# Patient Record
Sex: FEMALE | Race: WHITE | Employment: OTHER | URBAN - METROPOLITAN AREA
[De-identification: names, ages, dates, MRNs, and addresses within clinical notes are randomized per-mention and may not be internally consistent; named-entity substitution may affect disease eponyms.]

---

## 2023-06-26 ENCOUNTER — OFFICE VISIT (OUTPATIENT)
Age: 83
End: 2023-06-26
Payer: MEDICARE

## 2023-06-26 VITALS
DIASTOLIC BLOOD PRESSURE: 72 MMHG | RESPIRATION RATE: 16 BRPM | HEART RATE: 95 BPM | TEMPERATURE: 98.4 F | SYSTOLIC BLOOD PRESSURE: 117 MMHG | OXYGEN SATURATION: 98 %

## 2023-06-26 DIAGNOSIS — C43.72 MELANOMA OF LOWER LEG, LEFT (HCC): Primary | ICD-10-CM

## 2023-06-26 PROCEDURE — 1123F ACP DISCUSS/DSCN MKR DOCD: CPT | Performed by: SURGERY

## 2023-06-26 PROCEDURE — 99204 OFFICE O/P NEW MOD 45 MIN: CPT | Performed by: SURGERY

## 2023-06-28 ASSESSMENT — ENCOUNTER SYMPTOMS
SHORTNESS OF BREATH: 0
CHEST TIGHTNESS: 0

## 2023-07-10 ENCOUNTER — ANESTHESIA EVENT (OUTPATIENT)
Facility: HOSPITAL | Age: 83
End: 2023-07-10
Payer: MEDICARE

## 2023-07-11 ENCOUNTER — ANESTHESIA (OUTPATIENT)
Facility: HOSPITAL | Age: 83
End: 2023-07-11
Payer: MEDICARE

## 2023-07-11 ENCOUNTER — HOSPITAL ENCOUNTER (OUTPATIENT)
Facility: HOSPITAL | Age: 83
Setting detail: OUTPATIENT SURGERY
Discharge: NURSING, REHAB OR LONG-TERM CARE | End: 2023-07-11
Attending: SURGERY | Admitting: SURGERY
Payer: MEDICARE

## 2023-07-11 ENCOUNTER — HOSPITAL ENCOUNTER (OUTPATIENT)
Facility: HOSPITAL | Age: 83
Discharge: HOME | End: 2023-07-14
Attending: SURGERY
Payer: MEDICARE

## 2023-07-11 VITALS
HEIGHT: 60 IN | SYSTOLIC BLOOD PRESSURE: 130 MMHG | DIASTOLIC BLOOD PRESSURE: 71 MMHG | TEMPERATURE: 97.4 F | RESPIRATION RATE: 24 BRPM | OXYGEN SATURATION: 95 % | HEART RATE: 95 BPM | WEIGHT: 101 LBS | BODY MASS INDEX: 19.83 KG/M2

## 2023-07-11 DIAGNOSIS — C43.72 MELANOMA OF LOWER LEG, LEFT (HCC): ICD-10-CM

## 2023-07-11 DIAGNOSIS — C43.9 MALIGNANT MELANOMA, UNSPECIFIED SITE (HCC): Primary | ICD-10-CM

## 2023-07-11 PROCEDURE — 7100000010 HC PHASE II RECOVERY - FIRST 15 MIN: Performed by: SURGERY

## 2023-07-11 PROCEDURE — 88307 TISSUE EXAM BY PATHOLOGIST: CPT

## 2023-07-11 PROCEDURE — 15271 SKIN SUB GRAFT TRNK/ARM/LEG: CPT | Performed by: SURGERY

## 2023-07-11 PROCEDURE — 2709999900 HC NON-CHARGEABLE SUPPLY: Performed by: SURGERY

## 2023-07-11 PROCEDURE — 7100000000 HC PACU RECOVERY - FIRST 15 MIN: Performed by: SURGERY

## 2023-07-11 PROCEDURE — 6360000002 HC RX W HCPCS

## 2023-07-11 PROCEDURE — 2500000003 HC RX 250 WO HCPCS: Performed by: NURSE ANESTHETIST, CERTIFIED REGISTERED

## 2023-07-11 PROCEDURE — 2500000003 HC RX 250 WO HCPCS: Performed by: SURGERY

## 2023-07-11 PROCEDURE — 6360000002 HC RX W HCPCS: Performed by: NURSE ANESTHETIST, CERTIFIED REGISTERED

## 2023-07-11 PROCEDURE — 2580000003 HC RX 258: Performed by: SURGERY

## 2023-07-11 PROCEDURE — 11606 EXC TR-EXT MAL+MARG >4 CM: CPT | Performed by: SURGERY

## 2023-07-11 PROCEDURE — 88305 TISSUE EXAM BY PATHOLOGIST: CPT

## 2023-07-11 PROCEDURE — 7100000011 HC PHASE II RECOVERY - ADDTL 15 MIN: Performed by: SURGERY

## 2023-07-11 PROCEDURE — 6370000000 HC RX 637 (ALT 250 FOR IP): Performed by: NURSE ANESTHETIST, CERTIFIED REGISTERED

## 2023-07-11 PROCEDURE — 3600000002 HC SURGERY LEVEL 2 BASE: Performed by: SURGERY

## 2023-07-11 PROCEDURE — 2580000003 HC RX 258: Performed by: NURSE ANESTHETIST, CERTIFIED REGISTERED

## 2023-07-11 PROCEDURE — 38765 REMOVE GROIN LYMPH NODES: CPT | Performed by: SURGERY

## 2023-07-11 PROCEDURE — 6360000002 HC RX W HCPCS: Performed by: SURGERY

## 2023-07-11 PROCEDURE — 7100000001 HC PACU RECOVERY - ADDTL 15 MIN: Performed by: SURGERY

## 2023-07-11 PROCEDURE — 3700000000 HC ANESTHESIA ATTENDED CARE: Performed by: SURGERY

## 2023-07-11 PROCEDURE — 38900 IO MAP OF SENT LYMPH NODE: CPT | Performed by: SURGERY

## 2023-07-11 PROCEDURE — 3600000012 HC SURGERY LEVEL 2 ADDTL 15MIN: Performed by: SURGERY

## 2023-07-11 PROCEDURE — 3700000001 HC ADD 15 MINUTES (ANESTHESIA): Performed by: SURGERY

## 2023-07-11 DEVICE — GRAFT FISH-SKIN 7X10CM SURGICLOSE: Type: IMPLANTABLE DEVICE | Site: LEG | Status: FUNCTIONAL

## 2023-07-11 RX ORDER — PROPOFOL 10 MG/ML
INJECTION, EMULSION INTRAVENOUS PRN
Status: DISCONTINUED | OUTPATIENT
Start: 2023-07-11 | End: 2023-07-11 | Stop reason: SDUPTHER

## 2023-07-11 RX ORDER — PROCHLORPERAZINE EDISYLATE 5 MG/ML
10 INJECTION INTRAMUSCULAR; INTRAVENOUS
Status: DISCONTINUED | OUTPATIENT
Start: 2023-07-11 | End: 2023-07-11 | Stop reason: HOSPADM

## 2023-07-11 RX ORDER — SODIUM CHLORIDE 0.9 % (FLUSH) 0.9 %
5-40 SYRINGE (ML) INJECTION PRN
Status: DISCONTINUED | OUTPATIENT
Start: 2023-07-11 | End: 2023-07-11 | Stop reason: HOSPADM

## 2023-07-11 RX ORDER — FENTANYL CITRATE 50 UG/ML
INJECTION, SOLUTION INTRAMUSCULAR; INTRAVENOUS PRN
Status: DISCONTINUED | OUTPATIENT
Start: 2023-07-11 | End: 2023-07-11 | Stop reason: SDUPTHER

## 2023-07-11 RX ORDER — LIDOCAINE HYDROCHLORIDE 20 MG/ML
INJECTION, SOLUTION EPIDURAL; INFILTRATION; INTRACAUDAL; PERINEURAL PRN
Status: DISCONTINUED | OUTPATIENT
Start: 2023-07-11 | End: 2023-07-11 | Stop reason: SDUPTHER

## 2023-07-11 RX ORDER — FENTANYL CITRATE 50 UG/ML
25 INJECTION, SOLUTION INTRAMUSCULAR; INTRAVENOUS EVERY 5 MIN PRN
Status: DISCONTINUED | OUTPATIENT
Start: 2023-07-11 | End: 2023-07-11 | Stop reason: HOSPADM

## 2023-07-11 RX ORDER — ALBUTEROL SULFATE 2.5 MG/3ML
SOLUTION RESPIRATORY (INHALATION)
Status: COMPLETED
Start: 2023-07-11 | End: 2023-07-11

## 2023-07-11 RX ORDER — SODIUM CHLORIDE, SODIUM LACTATE, POTASSIUM CHLORIDE, CALCIUM CHLORIDE 600; 310; 30; 20 MG/100ML; MG/100ML; MG/100ML; MG/100ML
INJECTION, SOLUTION INTRAVENOUS CONTINUOUS
Status: DISCONTINUED | OUTPATIENT
Start: 2023-07-11 | End: 2023-07-11 | Stop reason: HOSPADM

## 2023-07-11 RX ORDER — SODIUM CHLORIDE 0.9 % (FLUSH) 0.9 %
5-40 SYRINGE (ML) INJECTION EVERY 12 HOURS SCHEDULED
Status: DISCONTINUED | OUTPATIENT
Start: 2023-07-11 | End: 2023-07-11 | Stop reason: HOSPADM

## 2023-07-11 RX ORDER — GLYCOPYRROLATE 0.2 MG/ML
INJECTION INTRAMUSCULAR; INTRAVENOUS PRN
Status: DISCONTINUED | OUTPATIENT
Start: 2023-07-11 | End: 2023-07-11 | Stop reason: SDUPTHER

## 2023-07-11 RX ORDER — SODIUM CHLORIDE 9 MG/ML
INJECTION, SOLUTION INTRAVENOUS PRN
Status: DISCONTINUED | OUTPATIENT
Start: 2023-07-11 | End: 2023-07-11 | Stop reason: HOSPADM

## 2023-07-11 RX ORDER — FAMOTIDINE 20 MG/1
20 TABLET, FILM COATED ORAL ONCE
Status: COMPLETED | OUTPATIENT
Start: 2023-07-11 | End: 2023-07-11

## 2023-07-11 RX ORDER — HYDROCODONE BITARTRATE AND ACETAMINOPHEN 5; 325 MG/1; MG/1
1 TABLET ORAL EVERY 4 HOURS PRN
Qty: 18 TABLET | Refills: 0 | Status: SHIPPED | OUTPATIENT
Start: 2023-07-11 | End: 2023-07-14

## 2023-07-11 RX ORDER — EPHEDRINE SULFATE/0.9% NACL/PF 50 MG/5 ML
SYRINGE (ML) INTRAVENOUS PRN
Status: DISCONTINUED | OUTPATIENT
Start: 2023-07-11 | End: 2023-07-11 | Stop reason: SDUPTHER

## 2023-07-11 RX ORDER — DEXAMETHASONE SODIUM PHOSPHATE 4 MG/ML
INJECTION, SOLUTION INTRA-ARTICULAR; INTRALESIONAL; INTRAMUSCULAR; INTRAVENOUS; SOFT TISSUE PRN
Status: DISCONTINUED | OUTPATIENT
Start: 2023-07-11 | End: 2023-07-11 | Stop reason: SDUPTHER

## 2023-07-11 RX ORDER — BUPIVACAINE HYDROCHLORIDE AND EPINEPHRINE 5; 5 MG/ML; UG/ML
INJECTION, SOLUTION EPIDURAL; INTRACAUDAL; PERINEURAL PRN
Status: DISCONTINUED | OUTPATIENT
Start: 2023-07-11 | End: 2023-07-11 | Stop reason: ALTCHOICE

## 2023-07-11 RX ORDER — ALBUTEROL SULFATE 2.5 MG/3ML
2.5 SOLUTION RESPIRATORY (INHALATION) ONCE
Status: COMPLETED | OUTPATIENT
Start: 2023-07-11 | End: 2023-07-11

## 2023-07-11 RX ORDER — ONDANSETRON 2 MG/ML
4 INJECTION INTRAMUSCULAR; INTRAVENOUS
Status: DISCONTINUED | OUTPATIENT
Start: 2023-07-11 | End: 2023-07-11 | Stop reason: HOSPADM

## 2023-07-11 RX ORDER — ONDANSETRON 2 MG/ML
INJECTION INTRAMUSCULAR; INTRAVENOUS PRN
Status: DISCONTINUED | OUTPATIENT
Start: 2023-07-11 | End: 2023-07-11 | Stop reason: SDUPTHER

## 2023-07-11 RX ORDER — DEXAMETHASONE SODIUM PHOSPHATE 4 MG/ML
INJECTION, SOLUTION INTRA-ARTICULAR; INTRALESIONAL; INTRAMUSCULAR; INTRAVENOUS; SOFT TISSUE PRN
Status: DISCONTINUED | OUTPATIENT
Start: 2023-07-11 | End: 2023-07-11

## 2023-07-11 RX ADMIN — PROPOFOL 100 MG: 10 INJECTION, EMULSION INTRAVENOUS at 16:14

## 2023-07-11 RX ADMIN — DEXMEDETOMIDINE HYDROCHLORIDE 10 MCG: 100 INJECTION, SOLUTION INTRAVENOUS at 16:07

## 2023-07-11 RX ADMIN — ALBUTEROL SULFATE 2.5 MG: 2.5 SOLUTION RESPIRATORY (INHALATION) at 18:03

## 2023-07-11 RX ADMIN — FENTANYL CITRATE 50 MCG: 50 INJECTION INTRAMUSCULAR; INTRAVENOUS at 16:14

## 2023-07-11 RX ADMIN — Medication 10 MG: at 16:44

## 2023-07-11 RX ADMIN — GLYCOPYRROLATE 0.2 MG: 0.2 INJECTION, SOLUTION INTRAMUSCULAR; INTRAVENOUS at 16:07

## 2023-07-11 RX ADMIN — FAMOTIDINE 20 MG: 20 TABLET ORAL at 14:30

## 2023-07-11 RX ADMIN — ONDANSETRON 4 MG: 2 INJECTION INTRAMUSCULAR; INTRAVENOUS at 16:23

## 2023-07-11 RX ADMIN — DEXAMETHASONE SODIUM PHOSPHATE 8 MG: 4 INJECTION, SOLUTION INTRAMUSCULAR; INTRAVENOUS at 16:25

## 2023-07-11 RX ADMIN — Medication 1.1 MILLICURIE: at 10:25

## 2023-07-11 RX ADMIN — FENTANYL CITRATE 50 MCG: 50 INJECTION INTRAMUSCULAR; INTRAVENOUS at 16:22

## 2023-07-11 RX ADMIN — PROPOFOL 50 MG: 10 INJECTION, EMULSION INTRAVENOUS at 16:52

## 2023-07-11 RX ADMIN — Medication 10 MG: at 16:31

## 2023-07-11 RX ADMIN — LIDOCAINE HYDROCHLORIDE 100 MG: 20 INJECTION, SOLUTION EPIDURAL; INFILTRATION; INTRACAUDAL; PERINEURAL at 16:14

## 2023-07-11 RX ADMIN — WATER 2000 MG: 1 INJECTION, SOLUTION INTRAMUSCULAR; INTRAVENOUS; SUBCUTANEOUS at 16:22

## 2023-07-11 RX ADMIN — SODIUM CHLORIDE, POTASSIUM CHLORIDE, SODIUM LACTATE AND CALCIUM CHLORIDE: 600; 310; 30; 20 INJECTION, SOLUTION INTRAVENOUS at 14:30

## 2023-07-11 ASSESSMENT — PAIN SCALES - GENERAL
PAINLEVEL_OUTOF10: 0

## 2023-07-11 ASSESSMENT — PAIN - FUNCTIONAL ASSESSMENT: PAIN_FUNCTIONAL_ASSESSMENT: 0-10

## 2023-07-11 ASSESSMENT — COPD QUESTIONNAIRES: CAT_SEVERITY: MILD

## 2023-07-11 NOTE — ANESTHESIA POSTPROCEDURE EVALUATION
Department of Anesthesiology  Postprocedure Note    Patient: Eli Castillo  MRN: [id number]  YOB: 1940  Date of evaluation: 7/11/2023      Procedure Summary     Date: 07/11/23 Room / Location: [facility] MAIN 05 / [facility] MAIN OR    Anesthesia Start: 2902 Anesthesia Stop: 4703    Procedure: EXCISION MELANOMA LEFT LEG; LYMPHOSCINTIGRAPHY WITH LEFT NODE BIOPSY (Left: Abdomen) Diagnosis:       Melanoma of lower leg, left (HCC)      (Melanoma of lower leg, left ([address]) [C43.72])    Surgeons: Nuno Barnard DO Responsible Provider: iSri Gann MD    Anesthesia Type: General ASA Status: 3          Anesthesia Type: General    Chavez Phase I: Chavez Score: 10    Chavez Phase II: Chavez Score: 10      Anesthesia Post Evaluation    Patient location during evaluation: PACU  Patient participation: complete - patient participated  Level of consciousness: sleepy but conscious  Pain score: 0  Airway patency: patent  Nausea & Vomiting: no nausea and no vomiting  Complications: no  Cardiovascular status: blood pressure returned to baseline  Respiratory status: acceptable  Hydration status: euvolemic

## 2023-07-11 NOTE — INTERVAL H&P NOTE
Update History & Physical    The patient's History and Physical of 7/11/2023 was reviewed with the patient and I examined the patient. There was no change. The surgical site was confirmed by the patient and me. Plan: The risks, benefits, expected outcome, and alternative to the recommended procedure have been discussed with the patient. Patient understands and wants to proceed with the procedure.      Electronically signed by Alexander Humphrey DO on 7/11/2023 at 3:38 PM

## 2023-07-11 NOTE — OP NOTE
Operative Note      Patient: Maribel Orozco  YOB: 1940  MRN: [id number]    Date of Procedure: 7/11/2023    Pre-Op Diagnosis Codes:     * Melanoma of lower leg, left ([address]) [C43.72]    Post-Op Diagnosis: Same       Procedure(s):  EXCISION MELANOMA LEFT LEG; LYMPHOSCINTIGRAPHY WITH LEFT NODE BIOPSY    Surgeon(s):  Uma Koehler DO    Assistant:   Surgical Assistant: Shiv Casiano    Anesthesia: General    Estimated Blood Loss (mL): Minimal    Complications: None    Specimens:   ID Type Source Tests Collected by Time Destination   A : LEFT LEG MELANOMA Tissue Leg SURGICAL PATHOLOGY Uma TrentcaylaDO 7/11/2023 1638    B : LEFT INGUINAL SENTINEL LYMPH NODE Tissue Groin SURGICAL PATHOLOGY Uma TrentcaylaDO 7/11/2023 1659        Implants:  Implant Name Type Inv. Item Serial No.  Lot No. LRB No. Used Action   GRAFT FISH-SKIN [id number] [employer] - [id number]  GRAFT FISH-SKIN [id number] [employer] [id number] [facility] [id number] Left 1 Implanted         Drains: * No LDAs found *    Findings: see dictation     Detailed Description of Procedure:   After informed consent was obtained the patient was taken to the operating room and placed in the supine position. General anesthesia was administered by the anesthetist and titrated to effect. The left lower extremity and left groin were prepped and draped in the usual sterile fashion and a time out procedure was performed. 2cc of methylene blue was injected at the site of melanoma. Next 1.0cm margin was marked around the border of the melanoma. In order to facilitate some closure we then made an elliptical incision around the border that measured 15.0cm x5.0cm x 1.0cm in depth. Bovie electrocautery was used to circumferentially dissect this tissue to fascia. The wound was hemostatic. The specimen was marked single short superior, single long lateral with skin anterior. Flaps were raised on the skin medially and laterally.  We were then

## 2023-07-11 NOTE — ANESTHESIA PRE PROCEDURE
Department of Anesthesiology  Preprocedure Note       Name:  Audrey Long   Age:  81 y.o.  :  1940                                          MRN:  [id number]         Date:  2023      Surgeon: Lidia Land):  Keshav Candelaria DO    Procedure: Procedure(s):  EXCISION MELANOMA LEFT LEG; LYMPHOSCINTIGRAPHY WITH LEFT NODE BIOPSY    Medications prior to admission:   Prior to Admission medications    Medication Sig Start Date End Date Taking?  Authorizing Provider   acetaminophen (TYLENOL) 325 MG tablet Take 2 tablets by mouth every 6 hours as needed 21   Ar Automatic Reconciliation   albuterol (PROVENTIL) (2.5 MG/3ML) 0.083% nebulizer solution Inhale into the lungs once    Ar Automatic Reconciliation   atorvastatin (LIPITOR) 40 MG tablet 1 tablet 19   Ar Automatic Reconciliation   ferrous sulfate (IRON 325) 325 (65 Fe) MG tablet Take 1 tablet by mouth 2 times daily 21   Ar Automatic Reconciliation   FLUoxetine (PROZAC) 40 MG capsule Take 1 capsule by mouth daily    Ar Automatic Reconciliation   fluticasone furoate-vilanterol (BREO ELLIPTA) 100-25 MCG/ACT inhaler Inhale 1 puff into the lungs daily    Ar Automatic Reconciliation   levothyroxine (SYNTHROID) 125 MCG tablet Take 100 mcg by mouth every morning (before breakfast)    Ar Automatic Reconciliation   mirabegron (MYRBETRIQ) 25 MG TB24 Take 1 tablet by mouth daily 21   Ar Automatic Reconciliation   montelukast (SINGULAIR) 10 MG tablet Take 1 tablet by mouth    Ar Automatic Reconciliation   nitrofurantoin, macrocrystal-monohydrate, (MACROBID) 100 MG capsule Take 1 capsule by mouth 2 times daily 21   Ar Automatic Reconciliation   omeprazole (PRILOSEC) 40 MG delayed release capsule Take 1 capsule by mouth daily    Ar Automatic Reconciliation   pravastatin (PRAVACHOL) 40 MG tablet Take 1 tablet by mouth    Ar Automatic Reconciliation   traZODone (DESYREL) 100 MG tablet Take 75 mg by mouth nightly    Ar Automatic Reconciliation   trospium

## 2023-07-11 NOTE — DISCHARGE INSTRUCTIONS
appointment scheduled within 14 days after surgery. If this is not yet scheduled, call the office. Any forms that you need filled out regarding your medical care can be brought to the office at follow up appointment of faxed to: [address] IF:  Temperature is over 101 degrees, a slight fever can be normal 24-48 hour after surgery. Nausea & vomiting that persists more than 24 hours after surgery. Your wound appears very red, hot, painful or swollen. Excessive bleeding occurs form the incision. *Between the hours 9-5 Monday-Friday please call the office at [phone].   If you do not receive a call back the same day, please do not hesitate to call my cell phone at [phone]    *If there is a medical emergency please go to the nearest emergency room immediately and do not hesitate to call my cell phone    [phone], after hours please call my cell phone

## 2023-07-11 NOTE — PERIOP NOTE
TRANSFER - OUT REPORT:    Verbal report given to Galindo Turcios on Lorenzo Spatz being transferred to [facility] for routine progression of patient care       Report consisted of patient's Situation, Background, Assessment and   Recommendations(SBAR). Information from the following report(s) Nurse Handoff Report, Surgery Report, and MAR was reviewed with the receiving nurse. Opportunity for questions and clarification was provided.       Patient transported with:   Patient-specific medications from Pharmacy

## 2023-07-17 ENCOUNTER — OFFICE VISIT (OUTPATIENT)
Age: 83
End: 2023-07-17

## 2023-07-17 DIAGNOSIS — C43.72 MELANOMA OF LOWER LEG, LEFT (HCC): Primary | ICD-10-CM

## 2023-07-17 PROCEDURE — 99024 POSTOP FOLLOW-UP VISIT: CPT | Performed by: SURGERY

## 2023-07-17 NOTE — PROGRESS NOTES
11/04/2021 Negative  Negative Final    Specific Gravity, Urine, POC 11/04/2021 1.025  1.001 - 1.035 NA Final    Blood (UA POC) 11/04/2021 Negative  Negative Final    pH, Urine, POC 11/04/2021 5.5  4.6 - 8.0 NA Final    Protein, Urine, POC 11/04/2021 Negative  Negative Final    Urobilinogen, POC 11/04/2021 0.2 mg/dL  0.2 - 1 Final    Nitrite, Urine, POC 11/04/2021 Positive  Negative Final    Leukocyte Esterase, Urine, POC 11/04/2021 1+  Negative Final       NM LYMPHOSCINTIGRAM  Narrative: EXAM: LYMPHOSCINTIGRAPHY FOR SENTINEL NODE IDENTIFICATION LEFT CALF    CLINICAL HISTORY/INDICATION: Left calf, for sentinel node resection. Malignant  melanoma of left lower limb, including hip    COMPARISON: None. TECHNIQUE: The procedure and possible complications were discussed with the  patient. All questions were answered. The consent was signed. A universal time  out was conducted before performing the procedure. The perilesional region of the left calf was first cleansed with Chloraprep. 2  ccs of one percent lidocaine buffered with sodium bicarbonate was infiltrated  intradermally in 4 injections around the lesion on the cranial, caudal and  medial lateral aspects. 1 millicurie of technetium 99m filtered sulfur colloid  was then infiltrated into the wheals produced by the lidocaine injection. Imaging was obtained over several hours in the anterior, anterior oblique, and  lateral projections. FINDINGS:    There is visualization of a lymph channel extending from about the 11 o'clock  position superiorly to the groin. There is definite visualization of the  sentinel node in the inguinal region. The sentinel node was marked on the skin. The films were sent with the patient to the OR.     Procedure/description: Lymphoscintigraphy left calf  Informed consent: yes  Date and time of procedure: July 11, 2023 at 10:26 AM  Operating physician: Natali Gilliam  Attending physician: same  Assistant: None  Preoperative

## 2023-07-26 ENCOUNTER — OFFICE VISIT (OUTPATIENT)
Age: 83
End: 2023-07-26

## 2023-07-26 VITALS
TEMPERATURE: 97.4 F | HEART RATE: 83 BPM | DIASTOLIC BLOOD PRESSURE: 66 MMHG | SYSTOLIC BLOOD PRESSURE: 100 MMHG | WEIGHT: 104 LBS | OXYGEN SATURATION: 98 % | RESPIRATION RATE: 18 BRPM | BODY MASS INDEX: 20.42 KG/M2 | HEIGHT: 60 IN

## 2023-07-26 DIAGNOSIS — C43.72 MELANOMA OF LOWER LEG, LEFT (HCC): Primary | ICD-10-CM

## 2023-07-26 PROCEDURE — 99024 POSTOP FOLLOW-UP VISIT: CPT | Performed by: SURGERY

## 2023-07-26 RX ORDER — SUCRALFATE 1 G/1
1 TABLET ORAL 4 TIMES DAILY
COMMUNITY

## 2023-07-26 RX ORDER — VIBEGRON 75 MG/1
TABLET, FILM COATED ORAL DAILY
COMMUNITY

## 2023-07-26 RX ORDER — NYSTATIN 100000 [USP'U]/G
POWDER TOPICAL
COMMUNITY

## 2023-07-26 RX ORDER — ASCORBIC ACID 500 MG
500 TABLET ORAL DAILY
COMMUNITY

## 2023-07-26 RX ORDER — FLUTICASONE FUROATE, UMECLIDINIUM BROMIDE AND VILANTEROL TRIFENATATE 100; 62.5; 25 UG/1; UG/1; UG/1
1 POWDER RESPIRATORY (INHALATION) DAILY
COMMUNITY

## 2023-07-26 RX ORDER — AMLODIPINE BESYLATE 2.5 MG/1
TABLET ORAL
COMMUNITY
Start: 2023-06-27

## 2023-07-26 RX ORDER — OMEGA-3/DHA/EPA/FISH OIL 300-1000MG
1 CAPSULE ORAL DAILY
COMMUNITY

## 2023-07-26 RX ORDER — SERTRALINE HYDROCHLORIDE 100 MG/1
100 TABLET, FILM COATED ORAL DAILY
COMMUNITY

## 2023-07-26 RX ORDER — DONEPEZIL HYDROCHLORIDE 10 MG/1
TABLET, FILM COATED ORAL
COMMUNITY
Start: 2023-07-02

## 2023-07-26 RX ORDER — TRAMADOL HYDROCHLORIDE 50 MG/1
50 TABLET ORAL EVERY 6 HOURS PRN
COMMUNITY

## 2023-07-26 RX ORDER — ESCITALOPRAM OXALATE 20 MG/1
TABLET ORAL
COMMUNITY
Start: 2021-05-17

## 2023-07-29 NOTE — PROGRESS NOTES
Sherren Cook is a 81 y.o. female  Chief Complaint   Patient presents with    Post-Op Check     Excision melanoma left lower leg
Skin graft 100% take       I have reviewed the information entered by the clinical staff and/or patient and verified it as accurate or edited where necessary.      Electronically signed by:    Jair Yarbrough DO, MPH

## 2023-08-01 ENCOUNTER — OFFICE VISIT (OUTPATIENT)
Age: 83
End: 2023-08-01

## 2023-08-01 VITALS
DIASTOLIC BLOOD PRESSURE: 76 MMHG | TEMPERATURE: 97.3 F | OXYGEN SATURATION: 97 % | RESPIRATION RATE: 16 BRPM | SYSTOLIC BLOOD PRESSURE: 114 MMHG | HEART RATE: 109 BPM

## 2023-08-01 DIAGNOSIS — C43.72 MELANOMA OF LOWER LEG, LEFT (HCC): Primary | ICD-10-CM

## 2023-08-01 PROCEDURE — 99024 POSTOP FOLLOW-UP VISIT: CPT | Performed by: SURGERY

## 2023-10-16 ENCOUNTER — OFFICE VISIT (OUTPATIENT)
Age: 83
End: 2023-10-16
Payer: MEDICARE

## 2023-10-16 VITALS
HEART RATE: 71 BPM | TEMPERATURE: 97.3 F | SYSTOLIC BLOOD PRESSURE: 105 MMHG | RESPIRATION RATE: 16 BRPM | DIASTOLIC BLOOD PRESSURE: 61 MMHG

## 2023-10-16 DIAGNOSIS — C43.72 MELANOMA OF LOWER LEG, LEFT (HCC): Primary | ICD-10-CM

## 2023-10-16 PROCEDURE — 1036F TOBACCO NON-USER: CPT | Performed by: SURGERY

## 2023-10-16 PROCEDURE — 99214 OFFICE O/P EST MOD 30 MIN: CPT | Performed by: SURGERY

## 2023-10-16 PROCEDURE — G8420 CALC BMI NORM PARAMETERS: HCPCS | Performed by: SURGERY

## 2023-10-16 PROCEDURE — G8427 DOCREV CUR MEDS BY ELIG CLIN: HCPCS | Performed by: SURGERY

## 2023-10-16 PROCEDURE — G8400 PT W/DXA NO RESULTS DOC: HCPCS | Performed by: SURGERY

## 2023-10-16 PROCEDURE — 1123F ACP DISCUSS/DSCN MKR DOCD: CPT | Performed by: SURGERY

## 2023-10-16 PROCEDURE — G8484 FLU IMMUNIZE NO ADMIN: HCPCS | Performed by: SURGERY

## 2023-10-16 PROCEDURE — 1090F PRES/ABSN URINE INCON ASSESS: CPT | Performed by: SURGERY

## 2023-10-16 NOTE — PROGRESS NOTES
Sofie Mclean is a 81 y.o. female (: 1940)     Chief Complaint   Patient presents with    Follow-up     Left leg        Medication list and allergies have been reviewed with Sofie Mclean and updated as of today's date. I have gone over all Medical, Surgical and Social History with Sofie Mclean and updated/added the information accordingly. 1. Have you been to the ER, urgent care clinic since your last visit? Hospitalized since your last visit? Yes [facility]    2. Have you seen or consulted any other health care providers outside of the [address] [place] [address] since your last visit? Include any pap smears or colon screening.  No

## 2023-10-19 ASSESSMENT — ENCOUNTER SYMPTOMS
CHEST TIGHTNESS: 0
COLOR CHANGE: 1
SHORTNESS OF BREATH: 0

## 2023-10-19 NOTE — PROGRESS NOTES
CC:   Chief Complaint   Patient presents with    Follow-up     Left leg         Assessment:    ICD-10-CM    1. Melanoma of lower leg, left ([address])  C43.72           Plan: She has healed well since surgery and can have some discomfort at the scar which should improve over time. I encouraged her to continue to protect this area with bandage as the skin is delicate and she has just healed. No active wound noted today. I encouraged her to elevate legs when possible and suggested she discuss with her nursing facility to obtain leg rest for her wheelchair. There is no need for additional follow up unless there are questions or concerns in the future. She agrees with this plan. HPI:  Michele Urban is a 81 y.o. female who is here today with history of left lower extremity melanoma. She arrived from nursing facility with no records and is here today by herself. She states she has moderate pain in the left leg and will occasionally get some drainage from her incision. She denies any other skin lesions that are concerning. She states nursing is no longer doing dressing changes and she does not have foot rest for her wheelchair and thinks this is contributing to pain. Allergies: Allergies   Allergen Reactions    Morphine Other (See Comments)     Other reaction(s): other/intolerance  ITCHING IN THE PAST-BUT HAS HAD IT SINCE AND DID NOT ITCH.         Medication Review:  Current Outpatient Medications on File Prior to Visit   Medication Sig Dispense Refill    amLODIPine (NORVASC) 2.5 MG tablet       B COMPLEX-C PO Take 1 tablet by mouth daily      diclofenac sodium (VOLTAREN) 1 % GEL APPLY 2 GRAMS TO EACH SHOULDER TOPICALLY 3 TIMES A DAY FOR PAIN      donepezil (ARICEPT) 10 MG tablet       fish oil-omega-3 fatty acids 1000 MG capsule Take 1 capsule by mouth daily      vitamin D3 (CHOLECALCIFEROL) 125 MCG (5000 UT) TABS tablet GIVE 1 TABLET BY MOUTH ONCE A DAY FOR VITAMIN DEFICIENCY      vibegron (GEMTESA) 75 MG TABS

## 2023-11-20 ENCOUNTER — OFFICE VISIT (OUTPATIENT)
Age: 83
End: 2023-11-20
Payer: MEDICARE

## 2023-11-20 ENCOUNTER — HOSPITAL ENCOUNTER (OUTPATIENT)
Facility: HOSPITAL | Age: 83
Setting detail: SPECIMEN
Discharge: HOME | End: 2023-11-23
Payer: MEDICARE

## 2023-11-20 DIAGNOSIS — C43.72 MELANOMA OF LOWER LEG, LEFT (HCC): Primary | ICD-10-CM

## 2023-11-20 PROCEDURE — 11104 PUNCH BX SKIN SINGLE LESION: CPT | Performed by: SURGERY

## 2023-11-20 PROCEDURE — 88305 TISSUE EXAM BY PATHOLOGIST: CPT

## 2023-11-21 ASSESSMENT — ENCOUNTER SYMPTOMS: COLOR CHANGE: 1

## 2023-11-21 NOTE — PROGRESS NOTES
CC:   Chief Complaint   Patient presents with    Follow-up     Leg Biopsy         Assessment:    ICD-10-CM    1. Melanoma of lower leg, left ([address])  C43.72 43394 - NM PUNCH BIOPSY SKIN SINGLE LESION          Plan: She tolerated punch biopsy in the clinic without complications. She was instructed to leave steri strips on until they fall off on their own. She can change gauze pad over the area daily for any drainage and supplies were provided to the patient. I will notify her of pathology results once they return. She agrees with this plan and will call prior to this time if there are questions or concerns. HPI:  Lizbeth Hernandez is a 81 y.o. female who is here today with history of left lower extremity melanoma- negative margins and negative sentinel lymph node. Wound nursing at facility called concerning discoloration at the surgical site and concern for recurrent melanoma requesting biopsy. Overall she is doing well today with no complaints. Allergies: Allergies   Allergen Reactions    Morphine Other (See Comments)     Other reaction(s): other/intolerance  ITCHING IN THE PAST-BUT HAS HAD IT SINCE AND DID NOT ITCH.         Medication Review:  Current Outpatient Medications on File Prior to Visit   Medication Sig Dispense Refill    amLODIPine (NORVASC) 2.5 MG tablet       B COMPLEX-C PO Take 1 tablet by mouth daily      diclofenac sodium (VOLTAREN) 1 % GEL APPLY 2 GRAMS TO EACH SHOULDER TOPICALLY 3 TIMES A DAY FOR PAIN      donepezil (ARICEPT) 10 MG tablet       fish oil-omega-3 fatty acids 1000 MG capsule Take 1 capsule by mouth daily      vitamin D3 (CHOLECALCIFEROL) 125 MCG (5000 UT) TABS tablet GIVE 1 TABLET BY MOUTH ONCE A DAY FOR VITAMIN DEFICIENCY      vibegron (GEMTESA) 75 MG TABS tablet Take by mouth daily      ipratropium (ATROVENT) 0.02 % nebulizer solution       nystatin (MYCOSTATIN) 082023 UNIT/GM powder       escitalopram (LEXAPRO) 20 MG tablet give 1 tablet by mouth once daily for DEPRESSION

## 2023-11-28 ENCOUNTER — TELEPHONE (OUTPATIENT)
Age: 83
End: 2023-11-28

## 2023-11-28 NOTE — TELEPHONE ENCOUNTER
Per Dr Cindy Deras, Can we please fax this over to her facility and also call patient to let her know this was just a scar- not cancer nothing to worry about thanks     Lvm asking pt to return call. Pt aware and voiced understanding.

## (undated) DEVICE — 3M™ TEGADERM™ TRANSPARENT FILM DRESSING FRAME STYLE, 1626W, 4 IN X 4-3/4 IN (10 CM X 12 CM), 50/CT 4CT/CASE: Brand: 3M™ TEGADERM™

## (undated) DEVICE — SUTURE PROL SZ 2-0 L30IN NONABSORBABLE BLU L26MM SH 1/2 CIR 8833H

## (undated) DEVICE — 3M™ STERI-STRIP™ COMPOUND BENZOIN TINCTURE 40 BAGS/CARTON 4 CARTONS/CASE C1544: Brand: 3M™ STERI-STRIP™

## (undated) DEVICE — PENCIL SMK EVAC ALL IN 1 DSGN ENH VISIBILITY IMPROVED AIR

## (undated) DEVICE — KIT CLN UP BON SECOURS MARYV

## (undated) DEVICE — DRAPE TWL SURG 16X26IN BLU ORB04] ALLCARE INC]

## (undated) DEVICE — INTENDED FOR TISSUE SEPARATION, AND OTHER PROCEDURES THAT REQUIRE A SHARP SURGICAL BLADE TO PUNCTURE OR CUT.: Brand: BARD-PARKER SAFETY BLADES SIZE 15, STERILE

## (undated) DEVICE — SKIN PREP TRAY 4 COMPARTM TRAY: Brand: MEDLINE INDUSTRIES, INC.

## (undated) DEVICE — PACK PROCEDURE SURG MAJ W/ BASIN LF

## (undated) DEVICE — SUTURE COAT VCRL SZ 3-0 L18IN ABSRB UD L24MM PS-1 3/8 CIR J683G

## (undated) DEVICE — GLOVE SURG SZ 8 L11.77IN FNGR THK9.8MIL STRW LTX POLYMER

## (undated) DEVICE — SPONGE SURG SM 3/8IN WHT PNUT DISECT RADPQ ST

## (undated) DEVICE — STRIP,CLOSURE,WOUND,MEDI-STRIP,1/2X4: Brand: MEDLINE

## (undated) DEVICE — APPLIER CLP L9.38IN M LIG TI DISP STR RNG HNDL LIGACLP

## (undated) DEVICE — ELECTRODE PT RET AD L9FT HI MOIST COND ADH HYDRGEL CORDED

## (undated) DEVICE — GLOVE ORTHO 7 1/2   MSG9475

## (undated) DEVICE — SOLUTION IV 1000ML 0.9% SOD CHL

## (undated) DEVICE — SUTURE PROL 4-0 L36IN NONABSORBABLE BLU V-7 L26MM 1/2 CIR 8975H

## (undated) DEVICE — SUTURE VCRL SZ 4-0 L18IN ABSRB UD L19MM PC-5 3/8 CIR J823G

## (undated) DEVICE — GAUZE,SPONGE,4"X4",12PLY,STERILE,LF,2'S: Brand: MEDLINE